# Patient Record
Sex: FEMALE | Race: OTHER
[De-identification: names, ages, dates, MRNs, and addresses within clinical notes are randomized per-mention and may not be internally consistent; named-entity substitution may affect disease eponyms.]

---

## 2022-02-20 ENCOUNTER — HOSPITAL ENCOUNTER (EMERGENCY)
Dept: HOSPITAL 15 - ER | Age: 27
Discharge: LEFT BEFORE DISCHARGE | End: 2022-02-20
Payer: MEDICAID

## 2022-02-20 VITALS — HEIGHT: 66 IN | WEIGHT: 235 LBS | BODY MASS INDEX: 37.77 KG/M2

## 2022-02-20 VITALS — DIASTOLIC BLOOD PRESSURE: 82 MMHG | SYSTOLIC BLOOD PRESSURE: 135 MMHG

## 2022-02-20 DIAGNOSIS — J45.909: ICD-10-CM

## 2022-02-20 DIAGNOSIS — N39.0: ICD-10-CM

## 2022-02-20 DIAGNOSIS — F41.9: Primary | ICD-10-CM

## 2022-02-20 LAB
ALBUMIN SERPL-MCNC: 4.3 G/DL (ref 3.4–5)
ALP SERPL-CCNC: 62 U/L (ref 45–117)
ALT SERPL-CCNC: 28 U/L (ref 13–56)
ANION GAP SERPL CALCULATED.3IONS-SCNC: 4 MMOL/L (ref 5–15)
BILIRUB SERPL-MCNC: 0.5 MG/DL (ref 0.2–1)
BUN SERPL-MCNC: 9 MG/DL (ref 7–18)
BUN/CREAT SERPL: 10.2
CALCIUM SERPL-MCNC: 9.1 MG/DL (ref 8.5–10.1)
CHLORIDE SERPL-SCNC: 108 MMOL/L (ref 98–107)
CO2 SERPL-SCNC: 27 MMOL/L (ref 21–32)
GLUCOSE SERPL-MCNC: 91 MG/DL (ref 74–106)
HCT VFR BLD AUTO: 40.8 % (ref 36–46)
HGB BLD-MCNC: 13.7 G/DL (ref 12.2–16.2)
MCH RBC QN AUTO: 28 PG (ref 28–32)
MCV RBC AUTO: 83.7 FL (ref 80–100)
NRBC BLD QL AUTO: 0 %
POTASSIUM SERPL-SCNC: 4.3 MMOL/L (ref 3.5–5.1)
PROT SERPL-MCNC: 8 G/DL (ref 6.4–8.2)
SODIUM SERPL-SCNC: 139 MMOL/L (ref 136–145)

## 2022-02-20 PROCEDURE — 85025 COMPLETE CBC W/AUTO DIFF WBC: CPT

## 2022-02-20 PROCEDURE — 93005 ELECTROCARDIOGRAM TRACING: CPT

## 2022-02-20 PROCEDURE — 71045 X-RAY EXAM CHEST 1 VIEW: CPT

## 2022-02-20 PROCEDURE — 81001 URINALYSIS AUTO W/SCOPE: CPT

## 2022-02-20 PROCEDURE — 36415 COLL VENOUS BLD VENIPUNCTURE: CPT

## 2022-02-20 PROCEDURE — 84484 ASSAY OF TROPONIN QUANT: CPT

## 2022-02-20 PROCEDURE — 80053 COMPREHEN METABOLIC PANEL: CPT

## 2023-04-20 ENCOUNTER — HOSPITAL ENCOUNTER (EMERGENCY)
Dept: HOSPITAL 15 - ER | Age: 28
Discharge: HOME | End: 2023-04-20
Payer: COMMERCIAL

## 2023-04-20 VITALS — SYSTOLIC BLOOD PRESSURE: 108 MMHG | DIASTOLIC BLOOD PRESSURE: 85 MMHG

## 2023-04-20 VITALS — HEIGHT: 66 IN | WEIGHT: 253.53 LBS | BODY MASS INDEX: 40.75 KG/M2

## 2023-04-20 DIAGNOSIS — J45.909: ICD-10-CM

## 2023-04-20 DIAGNOSIS — N39.0: ICD-10-CM

## 2023-04-20 DIAGNOSIS — Z79.899: ICD-10-CM

## 2023-04-20 DIAGNOSIS — R07.89: Primary | ICD-10-CM

## 2023-04-20 DIAGNOSIS — R06.02: ICD-10-CM

## 2023-04-20 DIAGNOSIS — F41.9: ICD-10-CM

## 2023-04-20 LAB
ALBUMIN SERPL-MCNC: 3.8 G/DL (ref 3.4–5)
ALP SERPL-CCNC: 61 U/L (ref 45–117)
ALT SERPL-CCNC: 36 U/L (ref 13–56)
ANION GAP SERPL CALCULATED.3IONS-SCNC: 5 MMOL/L (ref 5–15)
APTT PPP: 32.4 SEC (ref 24.6–33.4)
BILIRUB SERPL-MCNC: 0.4 MG/DL (ref 0.2–1)
BUN SERPL-MCNC: 9 MG/DL (ref 7–18)
BUN/CREAT SERPL: 10.1 (ref 10–20)
CALCIUM SERPL-MCNC: 9.5 MG/DL (ref 8.5–10.1)
CHLORIDE SERPL-SCNC: 112 MMOL/L (ref 98–107)
CO2 SERPL-SCNC: 23 MMOL/L (ref 21–32)
GLUCOSE SERPL-MCNC: 142 MG/DL (ref 74–106)
HCT VFR BLD AUTO: 40.9 % (ref 36–46)
HGB BLD-MCNC: 13.8 G/DL (ref 12.2–16.2)
INR PPP: 1.04 (ref 0.9–1.15)
MAGNESIUM SERPL-MCNC: 2.3 MG/DL (ref 1.6–2.6)
MCH RBC QN AUTO: 27.9 PG (ref 28–32)
MCV RBC AUTO: 82.4 FL (ref 80–100)
NRBC BLD QL AUTO: 0.1 %
POTASSIUM SERPL-SCNC: 4 MMOL/L (ref 3.5–5.1)
PROT SERPL-MCNC: 7.3 G/DL (ref 6.4–8.2)
SODIUM SERPL-SCNC: 140 MMOL/L (ref 136–145)

## 2023-04-20 PROCEDURE — 93005 ELECTROCARDIOGRAM TRACING: CPT

## 2023-04-20 PROCEDURE — 85379 FIBRIN DEGRADATION QUANT: CPT

## 2023-04-20 PROCEDURE — 80053 COMPREHEN METABOLIC PANEL: CPT

## 2023-04-20 PROCEDURE — 71275 CT ANGIOGRAPHY CHEST: CPT

## 2023-04-20 PROCEDURE — 93971 EXTREMITY STUDY: CPT

## 2023-04-20 PROCEDURE — 85025 COMPLETE CBC W/AUTO DIFF WBC: CPT

## 2023-04-20 PROCEDURE — 99285 EMERGENCY DEPT VISIT HI MDM: CPT

## 2023-04-20 PROCEDURE — 85730 THROMBOPLASTIN TIME PARTIAL: CPT

## 2023-04-20 PROCEDURE — 83880 ASSAY OF NATRIURETIC PEPTIDE: CPT

## 2023-04-20 PROCEDURE — 71045 X-RAY EXAM CHEST 1 VIEW: CPT

## 2023-04-20 PROCEDURE — 81001 URINALYSIS AUTO W/SCOPE: CPT

## 2023-04-20 PROCEDURE — 83735 ASSAY OF MAGNESIUM: CPT

## 2023-04-20 PROCEDURE — 36415 COLL VENOUS BLD VENIPUNCTURE: CPT

## 2023-04-20 PROCEDURE — 84484 ASSAY OF TROPONIN QUANT: CPT

## 2023-04-20 PROCEDURE — 85610 PROTHROMBIN TIME: CPT

## 2025-02-02 ENCOUNTER — HOSPITAL ENCOUNTER (EMERGENCY)
Dept: HOSPITAL 15 - ER | Age: 30
Discharge: LEFT BEFORE DISCHARGE | End: 2025-02-02
Payer: COMMERCIAL

## 2025-02-02 VITALS
DIASTOLIC BLOOD PRESSURE: 89 MMHG | OXYGEN SATURATION: 99 % | RESPIRATION RATE: 17 BRPM | SYSTOLIC BLOOD PRESSURE: 149 MMHG

## 2025-02-02 VITALS — WEIGHT: 243.83 LBS | BODY MASS INDEX: 39.19 KG/M2 | HEIGHT: 66 IN

## 2025-02-02 VITALS — HEART RATE: 67 BPM

## 2025-02-02 DIAGNOSIS — J45.909: ICD-10-CM

## 2025-02-02 DIAGNOSIS — R00.1: Primary | ICD-10-CM

## 2025-02-02 PROCEDURE — 93005 ELECTROCARDIOGRAM TRACING: CPT

## 2025-02-02 NOTE — ED.PDOC
SOB-HPI


HPI Comments


29 y.o female with PMHx of anxiety and asthma, presents to the ED for a chief 

complaint for SOB associated with chest pain that presented earlier today while 

breastfeeding. Patient reports she checked her heart rate on a pulse monitor she

had at home and her rate read between the high 40's-50's. Patient mentions 

giving birth about 6 days ago at [facility], denies any pelvic cramping, cold 

sweats, fever, chills, nausea or vomiting. Patient's heart rate ranges in the 

70's. No other history reported.


Chief Complaint:  Shortness of Breath


Time Seen by MD:  17:48


Primary Care Provider:  chula


Reviewed notes:  Nurses Notes, Medications, Allergies


Information Source:  Patient


Mode of Arrival:  Ambulatory


Severity:  Moderate


Timing:  Hours


Duration:  Since onset


Context:  At Rest


PE Risk Factors:  None


History of:  Anxiety


Modifying Factors:  Nothing


Associated Signs and Symptoms:  Chest Pain





Past Medical History


PAST MEDICAL HISTORY:  Anxiety, Asthma


Surgical History:  Denies all surgeries


GYN History:  Denies all GYN Hx





Family History


Family History:  Reviewed,noncontributory to illness





Social History


Smoker:  Non-Smoker


Alcohol:  Denies ETOH Use


Drugs:  Denies Drug Use


Lives In:  Home





Constitutional:  denies: chills, diaphoresis, fatigue, fever, malaise, sweats, 

weakness, others


EENTM:  denies: blurred vision, double vision, ear bleeding, ear discharge, ear 

drainage, ear pain, ear ringing, eye pain, eye redness, hearing loss, mouth 

pain, mouth swelling, nasal discharge, nose bleeding, nose congestion, nose 

pain, photophobia, tearing, throat pain, throat swelling, voice changes, others


Respiratory:  reports: SOB at rest, shortness of breath, SOB with excertion; 

denies: cough, hemoptysis, orthopnea, stridor, wheezing, others


Cardiovascular:  reports: chest pain; denies: dizzy spells, diaphoresis, Dyspnea

on exertion, edema, irregular heart beat, left arm pain, lightheadedness, 

palpitations, PND, syncope, others


Gastrointestinal:  denies: abdomen distended, abdominal pain, blood streaked 

bowels, constipated, diarrhea, dysphagia, difficulty swallowing, hematemesis, 

melena, nausea, poor appetite, poor fluid intake, rectal bleeding, rectal pain, 

vomiting, others


Genitourinary:  denies: abnormal vagina bleeding, burning, dyspareunia, dysuria,

flank pain, frequency, hematuria, incontinence, pain, pregnant, vagina 

discharge, urgency, others


Neurological:  denies: dizziness, fainting, headache, left sided numbness, left 

sided weakness, numbness, paresthesia, pre-existing deficit, right sided 

numbness, right sided weakness, seizure, speech problems, tingling, tremors, 

weakness, others


Musculoskeletal:  denies: back pain, gout, joint pain, joint swelling, muscle 

pain, muscle stiffness, neck pain, others


Integumetry:  denies: bruises, change in color, change in hair/nails, dryness, 

laceration, lesions, lumps, rash, wounds, others


Allergic/Immunocompromised:  denies: Difficulty Healing, Frequent Infections, 

Hives, Itching, others


Hematologic/Lymphatic:  denies: anemia, blood clots, easy bleeding, easy 

bruising, swollen glands, others


Endocrine:  denies: excessive hunger, excessive sweating, excessive thirst, 

excessive urination, flushing, intolerance to cold, intolerance to heat, 

unexplained weight gain, unexplained weight loss, others


Psychiatric:  denies: anxiety, bipolar disorder, depression, hopeless, panic 

disorder, schizophrenia, sleepless, suicidal, others


All Other Systems:  Reviewed and Negative





Physical Exam


General Appearance:  No Apparent Distress, Normal


HEENT:  Normal ENT Inspection, Pharynx Normal, TMs Normal


Neck:  Full Range of Motion, Non-Tender, Normal, Normal Inspection


Respiratory:  Chest Non-Tender, Lungs Clear, No Accessory Muscle Use, No 

Respiratory Distress, Normal Breath Sounds


Cardiovascular:  No Edema, No JVD, No Murmur, No Gallop, Normal Peripheral 

Pulses, Regular Rate/Rhythm


Breast Exam:  Deferred


Gastrointestinal:  No Organomegaly, Non Tender, No Pulsatile Mass, Normal Bowel 

Sounds, Soft


Genitalia:  Deferred


Pelvic:  Deferred


Rectal:  Deferred


Extremities:  No calf tenderness, Normal capillary refill, Normal inspection, 

Normal range of motion, Non-tender, No pedal edema


Musculoskeletal :  


   Apperance:  Normal


Neurologic:  Alert, CNs II-XII nml as Tested, No Motor Deficits, Normal Affect, 

Normal Mood, No Sensory Deficits


Cerebellar Function:  Normal


Reflexes:  Normal


Skin:  Dry, Normal Color, Warm


Lymphatic:  No Adenopathy





EKG


EKG :  


   Pulse Rate (adult):  67


   Axis:  Normal


   Cardiac Rhythm:  NSR


   Hypertrophy:  None


   ST:  Normal





Was a procedure done?


Was a procedure done?:  No





Differential Dx


Differential Diagnosis:  Anxiety, Asthma, Bronchitis, Pneumonia, Respiratory 

Distress, URI





X-Ray, Labs, Meds, VS





                                   Vital Signs








  Date Time  Temp Pulse Resp B/P (MAP) Pulse Ox O2 Delivery O2 Flow Rate FiO2


 


25 17:29  67      


 


25 17:24 97.4 66 17 149/89 (109) 99   








X-Ray, Labs, Meds, VS Comment


29-year-old female here today with a concern for bradycardia she noted on a 

pulse oximeter at home with a heart rate in the 40s to 50s without any other 

symptoms.  No numbness or weakness.  No chest pain.  States she has had 

bradycardia in the past the herself resolved.  Of note, patient recently gave 

birth to her baby this last week.  Uncomplicated pregnancy/delivery.  Plan was 

originally to order labs for the patient and do a cardiac workup however after 

the EKG the patient stated that she wanted to leave to breastfeed her  

and did not want to stay for blood work.  Patient left the ER from the triage 

area without signing any AMA forms as she stated she had to go.


Time of 1ST Reevaluation:  18:44


Reevaluation 1ST:  Unchanged


Patient Education/Counseling:  Diagnosis, Treatment, Prognosis


Family Education/Counseling:  No Family Present





Departure 1


Departure


Time of Disposition:  20:50


Impression:  


   Primary Impression:  


   Bradycardia


Disposition:  07 LEFT AWOL/ELOPED


Condition:  Stable





Critical Care Note


Critical Care Time?:  No





Stability


Stability form required:  No





Heart Score


Heart Score:  








Heart Score Response (Comments) Value


 


History N/A 0


 


EKG N/A 0


 


Age N/A 0


 


Risk Factors N/A 0


 


Troponin N/A 0


 


Total  0














I personally scribed for AJ BOURGEOIS MD ([email or web address]) on 25 at 18:53.  

Electronically submitted by Lyudmila Ram ([facility]).





AJ BOURGEOIS MD               2025 18:53

## 2025-02-02 NOTE — ECG
[facility]

                                       

Test Date:    2025               Test Time:    17:29:20

Pat Name:     ARELY SAGE               Department:   ER

Patient ID:   [id number]           Room:          

Gender:       F                        Technician:   

:          [id number]               Requested By: AJ BOURGEOIS

Order Number: [id number]        Reading MD:    

                                 Measurements

Intervals                              Axis          

Rate:         67                       P:            31

ND:           141                      QRS:          26

QRSD:         91                       T:            28

QT:           401                                    

QTc:          424                                    

                           Interpretive Statements

Sinus rhythm



Please click the below link to view image of tracing.